# Patient Record
Sex: FEMALE | Race: WHITE | NOT HISPANIC OR LATINO | Employment: FULL TIME | URBAN - METROPOLITAN AREA
[De-identification: names, ages, dates, MRNs, and addresses within clinical notes are randomized per-mention and may not be internally consistent; named-entity substitution may affect disease eponyms.]

---

## 2022-11-29 ENCOUNTER — OFFICE VISIT (OUTPATIENT)
Dept: AUDIOLOGY | Facility: CLINIC | Age: 52
End: 2022-11-29

## 2022-11-29 ENCOUNTER — OFFICE VISIT (OUTPATIENT)
Dept: OTOLARYNGOLOGY | Facility: CLINIC | Age: 52
End: 2022-11-29

## 2022-11-29 VITALS — HEIGHT: 67 IN | BODY MASS INDEX: 21.66 KG/M2 | TEMPERATURE: 97.8 F | WEIGHT: 138 LBS

## 2022-11-29 DIAGNOSIS — H65.03 NON-RECURRENT ACUTE SEROUS OTITIS MEDIA OF BOTH EARS: Primary | ICD-10-CM

## 2022-11-29 DIAGNOSIS — H90.3 SENSORY HEARING LOSS, BILATERAL: ICD-10-CM

## 2022-11-29 RX ORDER — METHYLPREDNISOLONE 4 MG/1
TABLET ORAL
Qty: 1 EACH | Refills: 0 | Status: SHIPPED | OUTPATIENT
Start: 2022-11-29

## 2022-11-29 RX ORDER — PREDNISONE 20 MG/1
TABLET ORAL
COMMUNITY
Start: 2022-11-10 | End: 2022-11-29 | Stop reason: DRUGHIGH

## 2022-11-29 RX ORDER — VALSARTAN 40 MG/1
TABLET ORAL
COMMUNITY
Start: 2022-08-15

## 2022-11-29 RX ORDER — NEOMYCIN SULFATE, POLYMYXIN B SULFATE AND HYDROCORTISONE 10; 3.5; 1 MG/ML; MG/ML; [USP'U]/ML
SUSPENSION/ DROPS AURICULAR (OTIC)
COMMUNITY
Start: 2022-11-06 | End: 2022-11-29 | Stop reason: DRUGHIGH

## 2022-11-29 RX ORDER — TURMERIC 100 %
1 POWDER (GRAM) MISCELLANEOUS DAILY
COMMUNITY

## 2022-11-29 RX ORDER — OFLOXACIN 3 MG/ML
4 SOLUTION AURICULAR (OTIC) 2 TIMES DAILY
Qty: 10 ML | Refills: 1 | Status: SHIPPED | OUTPATIENT
Start: 2022-11-29

## 2022-11-29 RX ORDER — CEFUROXIME AXETIL 500 MG/1
TABLET ORAL
COMMUNITY
Start: 2022-11-17

## 2022-11-29 NOTE — PATIENT INSTRUCTIONS
Fluticasone nasal spray one spray each nostril twice per day    Medrol dose pack for 6 days starting tomorrow    Then after complete that dose pack start Corcidin decongestant daily until next visit    Ear drops to left ear twice per day, prescription to pharmacy

## 2022-11-29 NOTE — PROGRESS NOTES
Assessment/Plan:    Non-recurrent acute serous otitis media of both ears  Symptoms include bilateral ears blocked since Oct 2022 with viral illness  Previous treatment includes two oral antibiotics, 5 day prednisone, and Mucinex  Reviewed risks hearing change due to serous fluid  Right TM with slight retraction, suspected serous fluid, white residue noted central TM  No visible perforation or otorrhea on the right    Left eac with honey crusted debris noted along the TM, retraction of the TM, and suspected serous fluid  No visible perforation or otorrhea  Audiogram today indicating bilateral mild SNHL, with No CHL  Right tymps type A  left tymp A/B   Good word discrimination      Reviewed options for otitis media in adult including resume nasal steroids, repeat oral steroids, decongestants, vs in office pe tube  Briefly discussed actual procedure of pe tube in office setting  After discussion agreed to:  Fluticasone nasal spray one spray each nostril twice per day    Medrol dose pack for 6 days starting tomorrow  Then after complete that dose pack start Corcidin decongestant daily until next visit    Ear drops to left ear twice per day, prescription to pharmacy    Follow up in 4 to 6 weeks for possible nasal endoscopy and pe tube, evaluation ears with microscope       Diagnoses and all orders for this visit:    Non-recurrent acute serous otitis media of both ears  -     Ambulatory referral to Audiology  -     methylPREDNISolone 4 MG tablet therapy pack; Use as directed on package  -     ofloxacin (FLOXIN) 0 3 % otic solution; Administer 4 drops into the left ear 2 (two) times a day    Other orders  -     valsartan (DIOVAN) 40 mg tablet  -     Turmeric, Curcuma Longa, (Turmeric Root) POWD; Take 1 capsule by mouth daily  -     Multiple Vitamins-Minerals (MULTIVITAMIN ADULT EXTRA C PO);  Take 1 capsule by mouth daily  -     GLUCOSAMINE-FISH OIL-EPA-DHA PO; Take 1 tablet by mouth daily  - cefuroxime (CEFTIN) 500 mg tablet;  (Patient not taking: Reported on 11/29/2022)  -     Discontinue: neomycin-polymyxin-hydrocortisone (CORTISPORIN) 0 35%-10,000 units/mL-1% otic suspension;  (Patient not taking: Reported on 11/29/2022)  -     Discontinue: predniSONE 20 mg tablet;  (Patient not taking: Reported on 11/29/2022)          Subjective:      Patient ID: Viky Guerra is a 47 y o  female  Presents today as a new patient due to ear concerns  Hearing gradually worsening  Bilateral ears feel blocked  No tinnitus  No otalgia or otorrhea  No history of ear surgery  No current hearing aids  Laryngitis October 2022  Nov noted sinus congestion  Lawerance Joo asleep and woke up with both ears clogged and awoke with drainage from left ear  Had significant ear pain  Next morning went to urgent care  Informed cyst in right ear and bilateral ear fluid and sinus infection  Treated with z pack  Sinuses improved  Ears remain unchanged  Then sought care with PCP  Prescribed steroid, felt no change  Returned PCP treated with stronger antibiotic and mucinex,  Finished medications over weekend  Hearing remains decreased  No nasal spray for the concerns  The following portions of the patient's history were reviewed and updated as appropriate: allergies, current medications, past family history, past medical history, past social history, past surgical history and problem list     Review of Systems   Constitutional: Negative  HENT: Positive for ear pain  Negative for congestion, ear discharge, hearing loss, nosebleeds, postnasal drip, rhinorrhea, sinus pressure, sinus pain, sore throat, tinnitus and voice change  Eyes: Negative  Respiratory: Negative for chest tightness and shortness of breath  Cardiovascular: Negative  Gastrointestinal: Negative  Endocrine: Negative  Musculoskeletal: Negative  Skin: Negative for color change     Neurological: Negative for dizziness, numbness and headaches  Psychiatric/Behavioral: Negative  Objective:      Temp 97 8 °F (36 6 °C) (Temporal)   Ht 5' 7" (1 702 m)   Wt 62 6 kg (138 lb)   BMI 21 61 kg/m²          Physical Exam  Constitutional:       Appearance: She is well-developed and well-nourished  HENT:      Head: Normocephalic  Right Ear: Hearing, tympanic membrane, ear canal and external ear normal  No decreased hearing noted  No drainage or tenderness  Tympanic membrane is not perforated or erythematous  Left Ear: Hearing, tympanic membrane, ear canal and external ear normal  No decreased hearing noted  No drainage or tenderness  Tympanic membrane is not perforated or erythematous  Nose: Nose normal  No nasal deformity, septal deviation or sinus tenderness  Mouth/Throat:      Mouth: Oropharynx is clear and moist and mucous membranes are normal  Mucous membranes are not pale and not dry  No oral lesions  Dentition: Normal dentition  Pharynx: Uvula midline  No oropharyngeal exudate  Neck:      Trachea: No tracheal deviation  Cardiovascular:      Rate and Rhythm: Normal rate  Pulmonary:      Effort: Pulmonary effort is normal  No accessory muscle usage or respiratory distress  Musculoskeletal:      Right shoulder: Normal range of motion  Cervical back: Full passive range of motion without pain, normal range of motion and neck supple  Lymphadenopathy:      Cervical: No cervical adenopathy  Skin:     General: Skin is warm, dry and intact  Neurological:      Mental Status: She is alert and oriented to person, place, and time  Cranial Nerves: No cranial nerve deficit  Sensory: No sensory deficit  Psychiatric:         Mood and Affect: Mood and affect normal          Behavior: Behavior is cooperative

## 2022-11-29 NOTE — PROGRESS NOTES
ENT HEARING EVALUATION    Name:  Christen Garcia  :  1970  Age:  47 y o  Date of Evaluation: 22     History: ENT Audiogram / recent ear infections, ear pain and decreased hearing   Reason for visit: Christen Garcia is being seen today at the request of Ms Sahra CELESTE for an evaluation of hearing as part of their initial ENT visit  EVALUATION:    Otoscopic Evaluation:   Right Ear:  Clear canal, see Ms Donny Chery note on TM status    Left Ear:   Clear canal, see Ms Tucker note on TM status     Tympanometry:   Right:  Type A, normal middle ear function   Left:   Possible Type As,  Type B (see tracings)    Audiogram Results:  Hearing sensitivity is within or bordering normal limits, 250-4000 Hz, sloping to a moderate degree, 7327-3204 Hz, bilaterally  Word recognition scores are 100%, in quiet, at 55/60 dBHL  *see attached audiogram    RECOMMENDATIONS:  1  Follow up per BOOKER Flynn  2  Re-evaluate audiologically upon completion of all otologic services        Zhane Saab , CCC-A, [place]# [id number]  Clinical Audiologist

## 2023-01-04 ENCOUNTER — OFFICE VISIT (OUTPATIENT)
Dept: OTOLARYNGOLOGY | Facility: CLINIC | Age: 53
End: 2023-01-04

## 2023-01-04 VITALS
HEIGHT: 67 IN | WEIGHT: 138 LBS | BODY MASS INDEX: 21.66 KG/M2 | SYSTOLIC BLOOD PRESSURE: 124 MMHG | DIASTOLIC BLOOD PRESSURE: 82 MMHG | TEMPERATURE: 98 F

## 2023-01-04 DIAGNOSIS — H65.92 OME (OTITIS MEDIA WITH EFFUSION), LEFT: Primary | ICD-10-CM

## 2023-01-04 DIAGNOSIS — H65.03 NON-RECURRENT ACUTE SEROUS OTITIS MEDIA OF BOTH EARS: ICD-10-CM

## 2023-01-04 RX ORDER — LISINOPRIL 10 MG/1
TABLET ORAL EVERY 24 HOURS
COMMUNITY

## 2023-01-04 NOTE — PROGRESS NOTES
Otolaryngology-- Head and Neck Surgery Follow up visit      Follow up:  11/29/22:  Hubert Shaffer today as a new patient due to ear concerns  Hearing gradually worsening  Bilateral ears feel blocked  No tinnitus  No otalgia or otorrhea  No history of ear surgery  No current hearing aids  Laryngitis October 2022  Nov noted sinus congestion  Mey Rubin asleep and woke up with both ears clogged and awoke with drainage from left ear  Had significant ear pain  Next morning went to urgent care  Informed cyst in right ear and bilateral ear fluid and sinus infection  Treated with z pack  Sinuses improved  Ears remain unchanged  Then sought care with PCP  Prescribed steroid, felt no change  Returned PCP treated with stronger antibiotic and mucinex,  Finished medications over weekend  Hearing remains decreased  No nasal spray for the concerns  Non-recurrent acute serous otitis media of both ears  Symptoms include bilateral ears blocked since Oct 2022 with viral illness  Previous treatment includes two oral antibiotics, 5 day prednisone, and Mucinex  Reviewed risks hearing change due to serous fluid  Right TM with slight retraction, suspected serous fluid, white residue noted central TM  No visible perforation or otorrhea on the right  Left eac with honey crusted debris noted along the TM, retraction of the TM, and suspected serous fluid  No visible perforation or otorrhea  Audiogram today indicating bilateral mild SNHL, with No CHL  Right tymps type A  left tymp A/B   Good word discrimination    Reviewed options for otitis media in adult including resume nasal steroids, repeat oral steroids, decongestants, vs in office pe tube  Briefly discussed actual procedure of pe tube in office setting  After discussion agreed to:  Fluticasone nasal spray one spray each nostril twice per day  Medrol dose pack for 6 days starting tomorrow    Then after complete that dose pack start Corcidin decongestant daily until next visit      1/4/23:  Here for left OME and ETD follow up  Much better     Review of any relevant imaging:      Interval Review of systems: Pertinent review of systems documented in the HPI  Interval Social History:  Social History     Socioeconomic History   • Marital status: /Civil Union     Spouse name: Not on file   • Number of children: Not on file   • Years of education: Not on file   • Highest education level: Not on file   Occupational History   • Not on file   Tobacco Use   • Smoking status: Never   • Smokeless tobacco: Never   Vaping Use   • Vaping Use: Never used   Substance and Sexual Activity   • Alcohol use: Never   • Drug use: Never   • Sexual activity: Not on file   Other Topics Concern   • Not on file   Social History Narrative   • Not on file     Social Determinants of Health     Financial Resource Strain: Not on file   Food Insecurity: Not on file   Transportation Needs: Not on file   Physical Activity: Not on file   Stress: Not on file   Social Connections: Not on file   Intimate Partner Violence: Not on file   Housing Stability: Not on file       Interval Physical Examination:  /82 (BP Location: Left arm, Patient Position: Sitting, Cuff Size: Adult)   Temp 98 °F (36 7 °C) (Temporal)   Ht 5' 7" (1 702 m)   Wt 62 6 kg (138 lb)   BMI 21 61 kg/m²   Head: Atraumatic, no visible scalp lesions, parotid and submandibular salivary glands non-tender to palpation and without masses bilaterally  Neck:  No visible or palpable cervical lesions or lymphadenopathy, thyroid gland is normal in size and symmetry and without masses, normal laryngeal elevation with swallowing  Ears:    Right ear :  Auricles normal in appearance, mastoid prominence non-tender, external auditory canal clear  Tympanic membrane intact  Left ear :  Auricles normal in appearance, mastoid prominence non-tender, external auditory canal clear  Tympanic membrane intact     Nose/Sinuses:  External appearance unremarkable, no maxillary or frontal sinus tenderness to palpation bilaterally  Anterior rhinoscopy reveals:  Oral Cavity:  Moist mucus membranes, gums and dentition unremarkable, no oral mucosal masses or lesions, floor of mouth soft, tongue mobile without masses or lesions  Oropharynx:  Base of tongue soft and without masses, tonsils bilaterally unremarkable, soft palate mucosa unremarkable  Abdomen: Soft and lax  Extremities: No bruises   Eyes:  Extra-ocular movements intact, pupils equally round and reactive to light and accommodation, the lids and conjunctivae are normal in appearance  Constitutional:  Well developed, well nourished and groomed, in no acute distress  Cardiovascular:  Normal rate and rhythm, no palpable thrills, no jugulovenous distension observed  Respiratory:  Normal respiratory effort without evidence of retractions or use of accessory muscles  Neurologic:  Cranial nerves II-XII intact bilaterally  Psychiatric:  Alert and oriented to time, place and person  Procedure:      Assessment:  1  OME (otitis media with effusion), left        2  Non-recurrent acute serous otitis media of both ears            Plan:    Here for left OME and ETD follow up   Much almita